# Patient Record
Sex: MALE | Race: OTHER
[De-identification: names, ages, dates, MRNs, and addresses within clinical notes are randomized per-mention and may not be internally consistent; named-entity substitution may affect disease eponyms.]

---

## 2018-04-20 ENCOUNTER — HOSPITAL ENCOUNTER (EMERGENCY)
Dept: HOSPITAL 12 - ER | Age: 28
Discharge: HOME | End: 2018-04-20
Payer: MEDICAID

## 2018-04-20 VITALS — HEIGHT: 67 IN | BODY MASS INDEX: 25.11 KG/M2 | WEIGHT: 160 LBS

## 2018-04-20 DIAGNOSIS — L03.211: Primary | ICD-10-CM

## 2018-04-20 DIAGNOSIS — J45.909: ICD-10-CM

## 2018-04-20 NOTE — NUR
PATIENT WAS SEEN BY MD FOR C/O FACIAL PAIN AND SWELLING ON ONE SIDE OF FACE.  
ENIES DIFFFICULTY SWALLOWING THROAT SWELLING OR SOB.  DC ,RX AND FOLLOW UP 
INSTRUCTIONS GIVEN AND EXPLAINED TO PATIENT WHO STATES HE UNDERSTANDS ALL 
INSTRUCTIONS.